# Patient Record
Sex: MALE | Race: WHITE | NOT HISPANIC OR LATINO | URBAN - METROPOLITAN AREA
[De-identification: names, ages, dates, MRNs, and addresses within clinical notes are randomized per-mention and may not be internally consistent; named-entity substitution may affect disease eponyms.]

---

## 2017-01-09 ENCOUNTER — INPATIENT (INPATIENT)
Facility: HOSPITAL | Age: 67
LOS: 1 days | Discharge: ANOTHER HOSPITAL | End: 2017-01-11
Attending: INTERNAL MEDICINE | Admitting: FAMILY MEDICINE
Payer: COMMERCIAL

## 2017-01-09 DIAGNOSIS — R79.89 OTHER SPECIFIED ABNORMAL FINDINGS OF BLOOD CHEMISTRY: ICD-10-CM

## 2017-01-09 DIAGNOSIS — I47.1 SUPRAVENTRICULAR TACHYCARDIA: ICD-10-CM

## 2017-01-09 PROCEDURE — 70450 CT HEAD/BRAIN W/O DYE: CPT | Mod: 26

## 2017-01-09 PROCEDURE — 93306 TTE W/DOPPLER COMPLETE: CPT | Mod: 26

## 2017-01-09 PROCEDURE — 71010: CPT | Mod: 26

## 2017-01-09 PROCEDURE — 99285 EMERGENCY DEPT VISIT HI MDM: CPT

## 2017-01-09 PROCEDURE — 93010 ELECTROCARDIOGRAM REPORT: CPT

## 2017-01-10 PROCEDURE — 93010 ELECTROCARDIOGRAM REPORT: CPT

## 2017-01-11 ENCOUNTER — INPATIENT (INPATIENT)
Facility: HOSPITAL | Age: 67
LOS: 1 days | Discharge: HOME | DRG: 274 | End: 2017-01-13
Attending: INTERNAL MEDICINE | Admitting: FAMILY MEDICINE
Payer: COMMERCIAL

## 2017-01-11 VITALS
SYSTOLIC BLOOD PRESSURE: 155 MMHG | OXYGEN SATURATION: 96 % | TEMPERATURE: 98 F | HEIGHT: 70 IN | DIASTOLIC BLOOD PRESSURE: 88 MMHG | WEIGHT: 191.8 LBS | RESPIRATION RATE: 18 BRPM | HEART RATE: 86 BPM

## 2017-01-11 DIAGNOSIS — I47.1 SUPRAVENTRICULAR TACHYCARDIA: ICD-10-CM

## 2017-01-11 PROCEDURE — 99285 EMERGENCY DEPT VISIT HI MDM: CPT

## 2017-01-11 PROCEDURE — 99222 1ST HOSP IP/OBS MODERATE 55: CPT

## 2017-01-11 PROCEDURE — 93010 ELECTROCARDIOGRAM REPORT: CPT

## 2017-01-11 RX ORDER — METOPROLOL TARTRATE 50 MG
25 TABLET ORAL DAILY
Qty: 0 | Refills: 0 | Status: DISCONTINUED | OUTPATIENT
Start: 2017-01-11 | End: 2017-01-12

## 2017-01-11 RX ORDER — ATORVASTATIN CALCIUM 80 MG/1
20 TABLET, FILM COATED ORAL AT BEDTIME
Qty: 0 | Refills: 0 | Status: DISCONTINUED | OUTPATIENT
Start: 2017-01-11 | End: 2017-01-13

## 2017-01-11 RX ORDER — INFLUENZA VIRUS VACCINE 15; 15; 15; 15 UG/.5ML; UG/.5ML; UG/.5ML; UG/.5ML
0.5 SUSPENSION INTRAMUSCULAR ONCE
Qty: 0 | Refills: 0 | Status: COMPLETED | OUTPATIENT
Start: 2017-01-11 | End: 2017-01-13

## 2017-01-11 RX ORDER — CHOLECALCIFEROL (VITAMIN D3) 125 MCG
1000 CAPSULE ORAL DAILY
Qty: 0 | Refills: 0 | Status: DISCONTINUED | OUTPATIENT
Start: 2017-01-11 | End: 2017-01-13

## 2017-01-11 RX ORDER — ASPIRIN/CALCIUM CARB/MAGNESIUM 324 MG
81 TABLET ORAL DAILY
Qty: 0 | Refills: 0 | Status: DISCONTINUED | OUTPATIENT
Start: 2017-01-11 | End: 2017-01-13

## 2017-01-11 RX ADMIN — ATORVASTATIN CALCIUM 20 MILLIGRAM(S): 80 TABLET, FILM COATED ORAL at 23:34

## 2017-01-11 NOTE — H&P ADULT. - NEGATIVE NEUROLOGICAL SYMPTOMS
no hemiparesis/no transient paralysis/no generalized seizures/no loss of sensation/no paresthesias/no facial palsy/no headache/no confusion/no vertigo/no focal seizures/no syncope/no difficulty walking/no weakness/no loss of consciousness/no tremors

## 2017-01-11 NOTE — ED ADULT NURSE NOTE - CHIEF COMPLAINT QUOTE
Patient BIBA, sent from [facility] for an ablation, originally went to [facility] for SVT and was given adenosine, patient offers no complaints at this time, denies any pain

## 2017-01-11 NOTE — H&P ADULT. - HISTORY OF PRESENT ILLNESS
67 years old male with PMH of HTN and HLD presented to [facility] 2 days ago with tingling in arms and not feeling well. Found to have SVT - got Adenosine and broke to Normal Sinus Rhythm. He was seen by Cardiologist and was recommended Ablation.  Currently, patient denies any chest pain, palpitations, shortness of breath, headache or dizziness.   He had ECHO which showed EF of 60%. Had Stress test in June 2016 and it was normal. 67 years old male with PMH of HTN and HLD presented to [facility] 2 days ago with tingling in arms and not feeling well. Found to have SVT - got Adenosine and cardioverted to Normal Sinus Rhythm. He was seen by Cardiologist and was recommended Ablation.  Currently, patient denies any chest pain, palpitations, shortness of breath, headache or dizziness.   He had ECHO which showed EF of 60%. Had Stress test in June 2016 and it was normal.

## 2017-01-11 NOTE — ED ADULT NURSE NOTE - OBJECTIVE STATEMENT
pt alert and awake x3 arrived from [facility] under cardiology for ablation, denies pain at this time, LS clear, resp even and unlabored bilat, arrived with 16g right AC and 20g left AC from [facility], awaiting for admission bed, will continue to monitor.

## 2017-01-11 NOTE — H&P ADULT. - ASSESSMENT
67 years old male with PMH of HTN and HLD presented to [facility] 2 days ago with tingling in arms and not feeling well. Found to have SVT - got Adenosine and broke to Normal Sinus Rhythm. He was seen by Cardiologist and was recommended Ablation.    1) SVT  - s/p Adenosine - currently in sinus rhythm. Needs ablation.  - NPO after midnight    2) HTN  - Continue Metoprolol 25 mg    3) HLD  - Continue Lipitor 20 mg    DVT Porphylaxis -- Heparin 5000 Units

## 2017-01-11 NOTE — ED PROVIDER NOTE - NS ED MD SCRIBE ATTENDING SCRIBE SECTIONS
PHYSICAL EXAM/REVIEW OF SYSTEMS/PAST MEDICAL/SURGICAL/SOCIAL HISTORY/HISTORY OF PRESENT ILLNESS/DISPOSITION/VITAL SIGNS( Pullset)

## 2017-01-11 NOTE — ED PROVIDER NOTE - OBJECTIVE STATEMENT
A 67 year old male pt presents to the ED as a transfer from [facility]. The pt was experiencing SVT and was sent to the ED 2 days ago. He was given adenosine and broke out of SVT. The pt was to be admitted today to be seen by cardiology for an ablation. He currently denies any symptoms and states that he feels fine. No further complaints at this time.

## 2017-01-11 NOTE — ED PROVIDER NOTE - MEDICAL DECISION MAKING DETAILS
A 67 year old male pt presents as a transfer from [facility]. Pt to be admitted to hospitalist service.

## 2017-01-11 NOTE — ED PROVIDER NOTE - PROGRESS NOTE DETAILS
Called logistics and spoke with Chacho who states pt is to be admitted to hospitalist service. Spoke with hospitalist who accepts pt for admission.

## 2017-01-11 NOTE — ED ADULT TRIAGE NOTE - CHIEF COMPLAINT QUOTE
Patient BIBA, sent from [facility] for an ablation, originally went to [facility] for SVT and was given adenosine, patient offers no complaints at this time, denies any pain

## 2017-01-11 NOTE — H&P ADULT. - NEGATIVE CARDIOVASCULAR SYMPTOMS
no chest pain/no paroxysmal nocturnal dyspnea/no dyspnea on exertion/no palpitations/no peripheral edema/no orthopnea

## 2017-01-11 NOTE — H&P ADULT. - NEUROLOGICAL DETAILS
responds to verbal commands/alert and oriented x 3/sensation intact/cranial nerves intact/normal strength

## 2017-01-11 NOTE — H&P ADULT. - FAMILY HISTORY
Mother  Still living? Unknown  Family history of leukemia, Age at diagnosis: Age Unknown     Sibling  Still living? Unknown  Family history of cancer, Age at diagnosis: Age Unknown     Aunt  Still living? Unknown  Family history of cancer, Age at diagnosis: Age Unknown

## 2017-01-12 DIAGNOSIS — Z90.89 ACQUIRED ABSENCE OF OTHER ORGANS: Chronic | ICD-10-CM

## 2017-01-12 DIAGNOSIS — Z98.890 OTHER SPECIFIED POSTPROCEDURAL STATES: Chronic | ICD-10-CM

## 2017-01-12 LAB
ANION GAP SERPL CALC-SCNC: 14 MMOL/L — SIGNIFICANT CHANGE UP (ref 5–17)
APTT BLD: 37.8 SEC — HIGH (ref 27.5–37.4)
BASOPHILS # BLD AUTO: 0 K/UL — SIGNIFICANT CHANGE UP (ref 0–0.2)
BASOPHILS NFR BLD AUTO: 0.2 % — SIGNIFICANT CHANGE UP (ref 0–2)
BLD GP AB SCN SERPL QL: SIGNIFICANT CHANGE UP
BUN SERPL-MCNC: 22 MG/DL — HIGH (ref 8–20)
CALCIUM SERPL-MCNC: 8.9 MG/DL — SIGNIFICANT CHANGE UP (ref 8.6–10.2)
CHLORIDE SERPL-SCNC: 100 MMOL/L — SIGNIFICANT CHANGE UP (ref 98–107)
CO2 SERPL-SCNC: 24 MMOL/L — SIGNIFICANT CHANGE UP (ref 22–29)
CREAT SERPL-MCNC: 0.84 MG/DL — SIGNIFICANT CHANGE UP (ref 0.5–1.3)
EOSINOPHIL # BLD AUTO: 0.2 K/UL — SIGNIFICANT CHANGE UP (ref 0–0.5)
EOSINOPHIL NFR BLD AUTO: 2.1 % — SIGNIFICANT CHANGE UP (ref 0–5)
GLUCOSE SERPL-MCNC: 102 MG/DL — SIGNIFICANT CHANGE UP (ref 70–115)
HCT VFR BLD CALC: 38.9 % — LOW (ref 42–52)
HGB BLD-MCNC: 13.3 G/DL — LOW (ref 14–18)
INR BLD: 1.12 RATIO — SIGNIFICANT CHANGE UP (ref 0.88–1.16)
LYMPHOCYTES # BLD AUTO: 3.1 K/UL — SIGNIFICANT CHANGE UP (ref 1–4.8)
LYMPHOCYTES # BLD AUTO: 31.4 % — SIGNIFICANT CHANGE UP (ref 20–55)
MAGNESIUM SERPL-MCNC: 1.9 MG/DL — SIGNIFICANT CHANGE UP (ref 1.8–2.5)
MCHC RBC-ENTMCNC: 29.6 PG — SIGNIFICANT CHANGE UP (ref 27–31)
MCHC RBC-ENTMCNC: 34.2 G/DL — SIGNIFICANT CHANGE UP (ref 32–36)
MCV RBC AUTO: 86.6 FL — SIGNIFICANT CHANGE UP (ref 80–94)
MONOCYTES # BLD AUTO: 0.7 K/UL — SIGNIFICANT CHANGE UP (ref 0–0.8)
MONOCYTES NFR BLD AUTO: 6.9 % — SIGNIFICANT CHANGE UP (ref 3–10)
NEUTROPHILS # BLD AUTO: 5.8 K/UL — SIGNIFICANT CHANGE UP (ref 1.8–8)
NEUTROPHILS NFR BLD AUTO: 59.2 % — SIGNIFICANT CHANGE UP (ref 37–73)
PHOSPHATE SERPL-MCNC: 3.3 MG/DL — SIGNIFICANT CHANGE UP (ref 2.4–4.7)
PLATELET # BLD AUTO: 317 K/UL — SIGNIFICANT CHANGE UP (ref 150–400)
POTASSIUM SERPL-MCNC: 4.1 MMOL/L — SIGNIFICANT CHANGE UP (ref 3.5–5.3)
POTASSIUM SERPL-SCNC: 4.1 MMOL/L — SIGNIFICANT CHANGE UP (ref 3.5–5.3)
PROTHROM AB SERPL-ACNC: 12.3 SEC — SIGNIFICANT CHANGE UP (ref 10–13.1)
RBC # BLD: 4.49 M/UL — LOW (ref 4.6–6.2)
RBC # FLD: 13.7 % — SIGNIFICANT CHANGE UP (ref 11–15.6)
SODIUM SERPL-SCNC: 138 MMOL/L — SIGNIFICANT CHANGE UP (ref 135–145)
TYPE + AB SCN PNL BLD: SIGNIFICANT CHANGE UP
WBC # BLD: 9.75 K/UL — SIGNIFICANT CHANGE UP (ref 4.8–10.8)
WBC # FLD AUTO: 9.75 K/UL — SIGNIFICANT CHANGE UP (ref 4.8–10.8)

## 2017-01-12 PROCEDURE — 99232 SBSQ HOSP IP/OBS MODERATE 35: CPT

## 2017-01-12 PROCEDURE — 93010 ELECTROCARDIOGRAM REPORT: CPT

## 2017-01-12 RX ORDER — ATORVASTATIN CALCIUM 80 MG/1
1 TABLET, FILM COATED ORAL
Qty: 0 | Refills: 0 | COMMUNITY

## 2017-01-12 RX ORDER — ALPRAZOLAM 0.25 MG
0.25 TABLET ORAL EVERY 6 HOURS
Qty: 0 | Refills: 0 | Status: DISCONTINUED | OUTPATIENT
Start: 2017-01-12 | End: 2017-01-13

## 2017-01-12 RX ORDER — ONDANSETRON 8 MG/1
4 TABLET, FILM COATED ORAL EVERY 6 HOURS
Qty: 0 | Refills: 0 | Status: DISCONTINUED | OUTPATIENT
Start: 2017-01-12 | End: 2017-01-13

## 2017-01-12 RX ORDER — CHOLECALCIFEROL (VITAMIN D3) 125 MCG
1 CAPSULE ORAL
Qty: 0 | Refills: 0 | COMMUNITY

## 2017-01-12 RX ORDER — ACETAMINOPHEN 500 MG
650 TABLET ORAL EVERY 6 HOURS
Qty: 0 | Refills: 0 | Status: DISCONTINUED | OUTPATIENT
Start: 2017-01-12 | End: 2017-01-13

## 2017-01-12 RX ORDER — HEPARIN SODIUM 5000 [USP'U]/ML
5000 INJECTION INTRAVENOUS; SUBCUTANEOUS EVERY 12 HOURS
Qty: 0 | Refills: 0 | Status: DISCONTINUED | OUTPATIENT
Start: 2017-01-12 | End: 2017-01-12

## 2017-01-12 RX ORDER — SODIUM CHLORIDE 9 MG/ML
1000 INJECTION, SOLUTION INTRAVENOUS
Qty: 0 | Refills: 0 | Status: DISCONTINUED | OUTPATIENT
Start: 2017-01-12 | End: 2017-01-13

## 2017-01-12 RX ORDER — ASPIRIN/CALCIUM CARB/MAGNESIUM 324 MG
1 TABLET ORAL
Qty: 0 | Refills: 0 | COMMUNITY

## 2017-01-12 RX ORDER — HEPARIN SODIUM 5000 [USP'U]/ML
5000 INJECTION INTRAVENOUS; SUBCUTANEOUS EVERY 8 HOURS
Qty: 0 | Refills: 0 | Status: DISCONTINUED | OUTPATIENT
Start: 2017-01-13 | End: 2017-01-13

## 2017-01-12 RX ADMIN — HEPARIN SODIUM 5000 UNIT(S): 5000 INJECTION INTRAVENOUS; SUBCUTANEOUS at 05:18

## 2017-01-12 RX ADMIN — Medication 81 MILLIGRAM(S): at 19:00

## 2017-01-12 RX ADMIN — Medication 650 MILLIGRAM(S): at 21:20

## 2017-01-12 RX ADMIN — HEPARIN SODIUM 5000 UNIT(S): 5000 INJECTION INTRAVENOUS; SUBCUTANEOUS at 00:15

## 2017-01-12 RX ADMIN — ATORVASTATIN CALCIUM 20 MILLIGRAM(S): 80 TABLET, FILM COATED ORAL at 21:20

## 2017-01-12 RX ADMIN — SODIUM CHLORIDE 100 MILLILITER(S): 9 INJECTION, SOLUTION INTRAVENOUS at 08:34

## 2017-01-12 RX ADMIN — Medication 1000 UNIT(S): at 19:00

## 2017-01-12 RX ADMIN — Medication 0.25 MILLIGRAM(S): at 21:20

## 2017-01-12 RX ADMIN — Medication 25 MILLIGRAM(S): at 05:22

## 2017-01-12 NOTE — DISCHARGE NOTE ADULT - NS AS ACTIVITY OBS
Walking-Indoors allowed/Showering allowed/Do not make important decisions/No Heavy lifting/straining/Do not drive or operate machinery/Walking-Outdoors allowed

## 2017-01-12 NOTE — DISCHARGE NOTE ADULT - HOSPITAL COURSE
67 years old male with PMH of HTN and HLD [facility] with tingling in arms and not feeling well. Found to have SVT - got Adenosine and broke to Normal Sinus Rhythm. He was seen by Cardiologist and was recommended Ablation. Now s/p ablation, tolerated well.    1) SVT  - s/p Adenosine - currently in sinus rhythm. S/P ablation, off BB as per cardio , follow up with cardio as tolerated    2) HTN - well controlled - off BB by cardio, follow up with cardio as outpatient    3) HLD  - Continue Lipitor 20 mg 67 years old male with PMH of HTN and HLD presented to [facility] 2 days ago with tingling in arms and not feeling well. Found to have SVT - got Adenosine and broke to Normal Sinus Rhythm. He was seen by Cardiologist and was recommended Ablation. Transferred to [facility] for EPS/RFA. 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 Doing well, stable for discharge    1) SVT  - s/p Adenosine - currently in sinus rhythm. S/P ablation, off BB as per cardio , follow up with cardio as tolerated    2) HTN - well controlled - off BB by cardio, follow up with cardio as outpatient    3) HLD  - Continue Lipitor 20 mg    4)add protonix 40 mg po daily x 1 month per Dr Marie  5) f/u Dr Marie 2-4 weeks 67 years old male with PMH of HTN and HLD presented to [facility] 2 days ago with tingling in arms and not feeling well. Found to have SVT - got Adenosine and broke to Normal Sinus Rhythm. He was seen by Cardiologist and was recommended Ablation. Transferred to [facility] for EPS/RFA. 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 Doing well, stable for discharge    1) SVT  - s/p Adenosine - currently in sinus rhythm. S/P ablation, off BB as per cardio , follow up with cardio as tolerated    2) HTN - well controlled - off BB by cardio, follow up with cardio as outpatient    3) HLD  - Continue Lipitor 20 mg    4)add protonix 40 mg po daily x 1 month per Dr Marie  5) f/u Dr Marie 2-4 weeks    seen on 1/13/17: no acute complaints in good spirits. VSS afebrile, AAOx3, NAD RRR s1s2, CTAB soft +BS, no edema. Right groin: no TTP, no ecchymosis noted.  nonfocal neurologic exam.   d/c home in stable condition. d/c planning 30 min.  d/w patient in detail.

## 2017-01-12 NOTE — PROGRESS NOTE ADULT - SUBJECTIVE AND OBJECTIVE BOX
PROCEDURE(S): Radiofrequency slow pathway modification for AVNRT    ELECTRPHYSIOLOGIST(S): Kole Marie MD         COMPLICATIONS:  none        DISPOSITION:  observation to telemetry     CONDITION: Stable      Pt doing well s/p uncomplicated AVNRT ablation. Denies complaint.       MEDICATIONS  (STANDING):  aspirin  chewable 81milliGRAM(s) Oral daily  atorvastatin 20milliGRAM(s) Oral at bedtime  cholecalciferol 1000Unit(s) Oral daily  influenza   Vaccine 0.5milliLiter(s) IntraMuscular once  heparin  Injectable 5000Unit(s) SubCutaneous every 12 hours  dextrose 5% + sodium chloride 0.45%. 1000milliLiter(s) IV Continuous <Continuous>    MEDICATIONS  (PRN):  acetaminophen   Tablet. 650milliGRAM(s) Oral every 6 hours PRN Mild Pain (1 - 3)  oxyCODONE  5 mG/acetaminophen 325 mG 1Tablet(s) Oral every 4 hours PRN Moderate Pain (4 - 6)  ondansetron Injectable 4milliGRAM(s) IV Push every 6 hours PRN Nausea and/or Vomiting  ALPRAZolam 0.25milliGRAM(s) Oral every 6 hours PRN anxiety and/or insomnia      Allergies  No Known Allergies      Exam:   HR: 83 (73 - 87)  BP: 99/55 (99/55 - 155/88)  RR: 18 (16 - 20)  SpO2: 96% (95% - 98%)  VSS, NAD, A&O x 3  Card: S1/S2, RRR, no m/g/r  Resp: lungs CTA b/l  Abd: S/NT/ND  Groins: C/D/I; no bleeding, hematoma, erythema, exudate or edema  Ext: no edema; distal pulses intact      Assessment:   67y male h/o adenosine sensitive SVT, now status post uncomplicated radiofrequency ablation of AVNRT.      Plan:   Bedrest x 4 hours following sheath removal and hemostasis, then OOB w/ assist & progress as tolerated.   Pain control w/ PO analgesia PRN.   Do not continue metoprolol.   Continue other prior  medications.   Start Protonix 40mg daily x 30 days.   Observation and monitoring on telemetry overnight with anticipated discharge in the AM and outpt follow up in 1 month.

## 2017-01-12 NOTE — DISCHARGE NOTE ADULT - MEDICATION SUMMARY - MEDICATIONS TO TAKE
I will START or STAY ON the medications listed below when I get home from the hospital:    aspirin 81 mg oral tablet  -- 1 tab(s) by mouth once a day  -- Indication: For antiplatelet    Lipitor 20 mg oral tablet  -- 1 tab(s) by mouth once a day  -- Indication: For Hld    pantoprazole 40 mg oral delayed release tablet  -- 1 tab(s) by mouth once a day (before a meal)  -- Indication: For gi prophy    Vitamin D3 1000 intl units oral tablet  -- 1 tab(s) by mouth once a day  -- Indication: For Supplement I will START or STAY ON the medications listed below when I get home from the hospital:    aspirin 81 mg oral tablet  -- 1 tab(s) by mouth once a day  -- Indication: For antiplatelet    Lipitor 20 mg oral tablet  -- 1 tab(s) by mouth once a day  -- Indication: For Hld    pantoprazole 40 mg oral delayed release tablet  -- 1 tab(s) by mouth once a day (before a meal)  -- Indication: For GI prophylaxis    Vitamin D3 1000 intl units oral tablet  -- 1 tab(s) by mouth once a day  -- Indication: For Supplement

## 2017-01-12 NOTE — PROGRESS NOTE ADULT - SUBJECTIVE AND OBJECTIVE BOX
Patient seen and examined . S/p  , POD #     CC :     HPI:  67 years old male with PMH of HTN and HLD presented to [facility] 2 days ago with tingling in arms and not feeling well. Found to have SVT - got Adenosine and cardioverted to Normal Sinus Rhythm. He was seen by Cardiologist and was recommended Ablation.  Currently, patient denies any chest pain, palpitations, shortness of breath, headache or dizziness.   He had ECHO which showed EF of 60%. Had Stress test in June 2016 and it was normal. Now s/p ablation.      PAST MEDICAL & SURGICAL HISTORY:  High cholesterol  HTN (hypertension)  History of tonsillectomy  H/O hernia repair  H/O knee surgery      MEDICATIONS  (STANDING):  aspirin  chewable 81milliGRAM(s) Oral daily  atorvastatin 20milliGRAM(s) Oral at bedtime  cholecalciferol 1000Unit(s) Oral daily  influenza   Vaccine 0.5milliLiter(s) IntraMuscular once  heparin  Injectable 5000Unit(s) SubCutaneous every 12 hours  dextrose 5% + sodium chloride 0.45%. 1000milliLiter(s) IV Continuous <Continuous>    MEDICATIONS  (PRN):  acetaminophen   Tablet. 650milliGRAM(s) Oral every 6 hours PRN Mild Pain (1 - 3)  oxyCODONE  5 mG/acetaminophen 325 mG 1Tablet(s) Oral every 4 hours PRN Moderate Pain (4 - 6)  ondansetron Injectable 4milliGRAM(s) IV Push every 6 hours PRN Nausea and/or Vomiting  ALPRAZolam 0.25milliGRAM(s) Oral every 6 hours PRN anxiety and/or insomnia      LABS:                          13.3   9.75  )-----------( 317      ( 12 Jan 2017 07:07 )             38.9     12 Jan 2017 07:07    138    |  100    |  22.0   ----------------------------<  102    4.1     |  24.0   |  0.84     Ca    8.9        12 Jan 2017 07:07  Phos  3.3       12 Jan 2017 07:07  Mg     1.9       12 Jan 2017 07:07      PT/INR - ( 12 Jan 2017 10:50 )   PT: 12.3 sec;   INR: 1.12 ratio         PTT - ( 12 Jan 2017 10:50 )  PTT:37.8 sec      RADIOLOGY & ADDITIONAL TESTS:        REVIEW OF SYSTEMS:    CONSTITUTIONAL: No fever, weight loss, or fatigue  EYES: No eye pain, visual disturbances, or discharge  ENMT:  No difficulty hearing, tinnitus, vertigo; No sinus or throat pain  NECK: No pain or stiffness  RESPIRATORY: No cough, wheezing, chills or hemoptysis; No shortness of breath  CARDIOVASCULAR: No chest pain, palpitations, dizziness, or leg swelling  GASTROINTESTINAL: No abdominal or epigastric pain. No nausea, vomiting, or hematemesis; No diarrhea or constipation. No melena or hematochezia.  GENITOURINARY: No dysuria, frequency, hematuria, or incontinence  NEUROLOGICAL: No headaches, memory loss, loss of strength, numbness, or tremors  SKIN: No itching, burning, rashes, or lesions   LYMPH NODES: No enlarged glands  ENDOCRINE: No heat or cold intolerance; No hair loss  MUSCULOSKELETAL:   PSYCHIATRIC: No depression, anxiety, mood swings, or difficulty sleeping  HEME/LYMPH: No easy bruising, or bleeding gums  ALLERGY AND IMMUNOLOGIC: No hives or eczema    Vital Signs Last 24 Hrs  T(C): 36.5, Max: 36.7 (01-11 @ 22:20)  T(F): 97.7, Max: 98.1 (01-11 @ 22:20)  HR: 83 (73 - 87)  BP: 99/55 (99/55 - 155/88)  BP(mean): --  RR: 18 (16 - 20)  SpO2: 96% (95% - 98%)  PHYSICAL EXAM:    GENERAL: NAD, well-groomed, well-developed  HEAD:  Atraumatic, Normocephalic  EYES: EOMI, PERRLA, conjunctiva and sclera clear  NECK: Supple, No JVD, Normal thyroid  NERVOUS SYSTEM:  Alert & Oriented X3, no focal deficit  CHEST/LUNG: CTA b/l ,  no  rales, rhonchi, wheezing, or rubs  HEART: Regular rate and rhythm; No murmurs, rubs, or gallops  ABDOMEN: Soft, Nontender, Nondistended; Bowel sounds present  EXTREMITIES:  2+ Peripheral Pulses, No clubbing, cyanosis, or edema  LYMPH: No lymphadenopathy noted  SKIN: No rashes or lesions Patient seen and examined . S/p ablation , tolerated well .    CC : not feeling well , b/l arms tingling- now resolved    HPI:  67 years old male with PMH of HTN and HLD presented to [facility] 2 days ago with tingling in arms and not feeling well. Found to have SVT - got Adenosine and cardioverted to Normal Sinus Rhythm. He was seen by Cardiologist and was recommended Ablation.  Currently, patient denies any chest pain, palpitations, shortness of breath, headache or dizziness.   He had ECHO which showed EF of 60%. Had Stress test in June 2016 and it was normal. Now s/p ablation.      PAST MEDICAL & SURGICAL HISTORY:  High cholesterol  HTN (hypertension)  History of tonsillectomy  H/O hernia repair  H/O knee surgery      MEDICATIONS  (STANDING):  aspirin  chewable 81milliGRAM(s) Oral daily  atorvastatin 20milliGRAM(s) Oral at bedtime  cholecalciferol 1000Unit(s) Oral daily  influenza   Vaccine 0.5milliLiter(s) IntraMuscular once  heparin  Injectable 5000Unit(s) SubCutaneous every 12 hours  dextrose 5% + sodium chloride 0.45%. 1000milliLiter(s) IV Continuous <Continuous>    MEDICATIONS  (PRN):  acetaminophen   Tablet. 650milliGRAM(s) Oral every 6 hours PRN Mild Pain (1 - 3)  oxyCODONE  5 mG/acetaminophen 325 mG 1Tablet(s) Oral every 4 hours PRN Moderate Pain (4 - 6)  ondansetron Injectable 4milliGRAM(s) IV Push every 6 hours PRN Nausea and/or Vomiting  ALPRAZolam 0.25milliGRAM(s) Oral every 6 hours PRN anxiety and/or insomnia      LABS:                          13.3   9.75  )-----------( 317      ( 12 Jan 2017 07:07 )             38.9     12 Jan 2017 07:07    138    |  100    |  22.0   ----------------------------<  102    4.1     |  24.0   |  0.84     Ca    8.9        12 Jan 2017 07:07  Phos  3.3       12 Jan 2017 07:07  Mg     1.9       12 Jan 2017 07:07      PT/INR - ( 12 Jan 2017 10:50 )   PT: 12.3 sec;   INR: 1.12 ratio         PTT - ( 12 Jan 2017 10:50 )  PTT:37.8 sec      RADIOLOGY & ADDITIONAL TESTS: none        REVIEW OF SYSTEMS:    CONSTITUTIONAL: No fever, weight loss, or fatigue  EYES: No eye pain, visual disturbances, or discharge  ENMT:  No difficulty hearing, tinnitus, vertigo; No sinus or throat pain  NECK: No pain or stiffness  RESPIRATORY: No cough, wheezing, chills or hemoptysis; No shortness of breath  CARDIOVASCULAR: No chest pain, palpitations, dizziness, or leg swelling  GASTROINTESTINAL: No abdominal or epigastric pain. No nausea, vomiting, or hematemesis; No diarrhea or constipation. No melena or hematochezia.  GENITOURINARY: No dysuria, frequency, hematuria, or incontinence  NEUROLOGICAL: No headaches, memory loss, loss of strength, numbness, or tremors  SKIN: No itching, burning, rashes, or lesions   LYMPH NODES: No enlarged glands  ENDOCRINE: No heat or cold intolerance; No hair loss  MUSCULOSKELETAL:   PSYCHIATRIC: No depression, anxiety, mood swings, or difficulty sleeping  HEME/LYMPH: No easy bruising, or bleeding gums  ALLERGY AND IMMUNOLOGIC: No hives or eczema    Vital Signs Last 24 Hrs  T(C): 36.5, Max: 36.7 (01-11 @ 22:20)  T(F): 97.7, Max: 98.1 (01-11 @ 22:20)  HR: 83 (73 - 87)  BP: 99/55 (99/55 - 155/88)  BP(mean): --  RR: 18 (16 - 20)  SpO2: 96% (95% - 98%)  PHYSICAL EXAM:    GENERAL: NAD, well-groomed, well-developed  HEAD:  Atraumatic, Normocephalic  EYES: EOMI, PERRLA, conjunctiva and sclera clear  NECK: Supple, No JVD, Normal thyroid  NERVOUS SYSTEM:  Alert & Oriented X3, no focal deficit  CHEST/LUNG: CTA b/l ,  no  rales, rhonchi, wheezing, or rubs  HEART: Regular rate and rhythm; No murmurs, rubs, or gallops  ABDOMEN: Soft, Nontender, Nondistended; Bowel sounds present  EXTREMITIES:  2+ Peripheral Pulses, No clubbing, cyanosis, or edema  LYMPH: No lymphadenopathy noted  SKIN: No rashes or lesions

## 2017-01-12 NOTE — DISCHARGE NOTE ADULT - PLAN OF CARE
minimize arrhythmia burden; optimize cardiac health - Bruising at the groin, sometimes extending down the leg, and/or a small lump under the skin at the groin access site is normal and will resolve within 2 – 3 weeks.   - Occasional skipped beats or palpitations that last for a few beats are common and generally resolve within 1-2 months.   - You make walk and take stairs at a regular pace.   - Do not perform any exercise more strenuous than walking for 1 week.   - Do not strain or lift heavy objects for 1 week.  - You may shower the day after the procedure.  - Do not soak in water (such as tub baths, hot tubs, swimming, etc.) for 1 week.   - You may resume all other activities the day after the procedure.  Call your doctor if:   - you notice bleeding, redness, drainage, swelling, increased tenderness or a hot sensation around the catheter insertion site.   - your temperature is greater than 100 degrees F for more than 24 hours.  - your rapid heart rhythm returns.  - you have any questions or concerns regarding the procedure.  If significant bleeding and/or a large lump (the size of a golf ball or bigger) occurs:  - Lie flat and apply continuous direct pressure just above the puncture site for at least 10 minutes  - If the issue resolves, notify your physician immediately.    - If the bleeding cannot be controlled, please seek immediate medical attention.  If you experience increased difficulty breathing or chest pain, or if you faint or have dizzy spells, please seek immediate medical attention.

## 2017-01-12 NOTE — DISCHARGE NOTE ADULT - VISION (WITH CORRECTIVE LENSES IF THE PATIENT USUALLY WEARS THEM):
wears bifocals Normal vision: sees adequately in most situations; can see medication labels, newsprint/wears bifocals

## 2017-01-12 NOTE — PROGRESS NOTE ADULT - ASSESSMENT
67 years old male with PMH of HTN and HLD presented to [facility] 2 days ago with tingling in arms and not feeling well. Found to have SVT - got Adenosine and broke to Normal Sinus Rhythm. He was seen by Cardiologist and was recommended Ablation.    1) SVT  - s/p Adenosine - currently in sinus rhythm. S/P ablation, cardiology follow       2) HTN  - Continue Metoprolol 25 mg    3) HLD  - Continue Lipitor 20 mg    DVT Porphylaxis -- Heparin 5000 Units 67 years old male with PMH of HTN and HLD presented to [facility] 2 days ago with tingling in arms and not feeling well. Found to have SVT - got Adenosine and broke to Normal Sinus Rhythm. He was seen by Cardiologist and was recommended Ablation. Now s/p ablation, tolerated well.    1) SVT  - s/p Adenosine - currently in sinus rhythm. S/P ablation, off BB as per cardio , will monitor for 24 hrs, if stable possible d/c home in am .    2) HTN - well controlled - off BB by cardio, follow up with cardio as outpatient    3) HLD  - Continue Lipitor 20 mg    DVT Porphylaxis -- Heparin 5000 Units, will hold tonight

## 2017-01-12 NOTE — PROGRESS NOTE ADULT - SUBJECTIVE AND OBJECTIVE BOX
preop for EPS/SVT ablation with Dr Marie  transferred from [facility] yesterday  chart reviewed  pt is npo  hold SQ heparin  hold metoprolol  stat PT/INR and type and screen    TTE 1/10/17: EF 60%, trace MR  reported negative stress test 6/2016    R/B/A reviewed, pt is agreeable

## 2017-01-12 NOTE — DISCHARGE NOTE ADULT - PATIENT PORTAL LINK FT
“You can access the [facility] Patient Portal, offered by [facility], by registering with the following website: [email or web address]”

## 2017-01-12 NOTE — DISCHARGE NOTE ADULT - CARE PROVIDER_API CALL
Kole Marie), Cardiovascular Disease; Internal Medicine  [address] [place] [address]  Phone: [phone]  Fax: [phone]

## 2017-01-13 VITALS
HEART RATE: 80 BPM | RESPIRATION RATE: 16 BRPM | SYSTOLIC BLOOD PRESSURE: 108 MMHG | TEMPERATURE: 98 F | OXYGEN SATURATION: 98 % | DIASTOLIC BLOOD PRESSURE: 60 MMHG

## 2017-01-13 LAB
ANION GAP SERPL CALC-SCNC: 11 MMOL/L — SIGNIFICANT CHANGE UP (ref 5–17)
BUN SERPL-MCNC: 20 MG/DL — SIGNIFICANT CHANGE UP (ref 8–20)
CALCIUM SERPL-MCNC: 9 MG/DL — SIGNIFICANT CHANGE UP (ref 8.6–10.2)
CHLORIDE SERPL-SCNC: 99 MMOL/L — SIGNIFICANT CHANGE UP (ref 98–107)
CO2 SERPL-SCNC: 26 MMOL/L — SIGNIFICANT CHANGE UP (ref 22–29)
CREAT SERPL-MCNC: 0.87 MG/DL — SIGNIFICANT CHANGE UP (ref 0.5–1.3)
GLUCOSE SERPL-MCNC: 89 MG/DL — SIGNIFICANT CHANGE UP (ref 70–115)
HCT VFR BLD CALC: 39 % — LOW (ref 42–52)
HGB BLD-MCNC: 13.3 G/DL — LOW (ref 14–18)
MAGNESIUM SERPL-MCNC: 2.1 MG/DL — SIGNIFICANT CHANGE UP (ref 1.8–2.5)
MCHC RBC-ENTMCNC: 29.3 PG — SIGNIFICANT CHANGE UP (ref 27–31)
MCHC RBC-ENTMCNC: 34.1 G/DL — SIGNIFICANT CHANGE UP (ref 32–36)
MCV RBC AUTO: 85.9 FL — SIGNIFICANT CHANGE UP (ref 80–94)
PLATELET # BLD AUTO: 317 K/UL — SIGNIFICANT CHANGE UP (ref 150–400)
POTASSIUM SERPL-MCNC: 3.9 MMOL/L — SIGNIFICANT CHANGE UP (ref 3.5–5.3)
POTASSIUM SERPL-SCNC: 3.9 MMOL/L — SIGNIFICANT CHANGE UP (ref 3.5–5.3)
RBC # BLD: 4.54 M/UL — LOW (ref 4.6–6.2)
RBC # FLD: 13.8 % — SIGNIFICANT CHANGE UP (ref 11–15.6)
SODIUM SERPL-SCNC: 136 MMOL/L — SIGNIFICANT CHANGE UP (ref 135–145)
WBC # BLD: 7.44 K/UL — SIGNIFICANT CHANGE UP (ref 4.8–10.8)
WBC # FLD AUTO: 7.44 K/UL — SIGNIFICANT CHANGE UP (ref 4.8–10.8)

## 2017-01-13 PROCEDURE — 84100 ASSAY OF PHOSPHORUS: CPT

## 2017-01-13 PROCEDURE — 85027 COMPLETE CBC AUTOMATED: CPT

## 2017-01-13 PROCEDURE — C1730: CPT

## 2017-01-13 PROCEDURE — 83735 ASSAY OF MAGNESIUM: CPT

## 2017-01-13 PROCEDURE — 90686 IIV4 VACC NO PRSV 0.5 ML IM: CPT

## 2017-01-13 PROCEDURE — 86901 BLOOD TYPING SEROLOGIC RH(D): CPT

## 2017-01-13 PROCEDURE — 85730 THROMBOPLASTIN TIME PARTIAL: CPT

## 2017-01-13 PROCEDURE — 99239 HOSP IP/OBS DSCHRG MGMT >30: CPT

## 2017-01-13 PROCEDURE — 93010 ELECTROCARDIOGRAM REPORT: CPT

## 2017-01-13 PROCEDURE — C1733: CPT

## 2017-01-13 PROCEDURE — 86900 BLOOD TYPING SEROLOGIC ABO: CPT

## 2017-01-13 PROCEDURE — 99285 EMERGENCY DEPT VISIT HI MDM: CPT

## 2017-01-13 PROCEDURE — 80048 BASIC METABOLIC PNL TOTAL CA: CPT

## 2017-01-13 PROCEDURE — 36415 COLL VENOUS BLD VENIPUNCTURE: CPT

## 2017-01-13 PROCEDURE — 93005 ELECTROCARDIOGRAM TRACING: CPT

## 2017-01-13 PROCEDURE — 86850 RBC ANTIBODY SCREEN: CPT

## 2017-01-13 PROCEDURE — 85610 PROTHROMBIN TIME: CPT

## 2017-01-13 RX ORDER — PANTOPRAZOLE SODIUM 20 MG/1
40 TABLET, DELAYED RELEASE ORAL
Qty: 0 | Refills: 0 | Status: DISCONTINUED | OUTPATIENT
Start: 2017-01-13 | End: 2017-01-13

## 2017-01-13 RX ORDER — METOPROLOL TARTRATE 50 MG
1 TABLET ORAL
Qty: 0 | Refills: 0 | COMMUNITY

## 2017-01-13 RX ORDER — PANTOPRAZOLE SODIUM 20 MG/1
1 TABLET, DELAYED RELEASE ORAL
Qty: 30 | Refills: 0 | OUTPATIENT
Start: 2017-01-13 | End: 2017-02-12

## 2017-01-13 RX ADMIN — HEPARIN SODIUM 5000 UNIT(S): 5000 INJECTION INTRAVENOUS; SUBCUTANEOUS at 05:23

## 2017-01-13 RX ADMIN — Medication 81 MILLIGRAM(S): at 11:32

## 2017-01-13 RX ADMIN — Medication 1000 UNIT(S): at 11:32

## 2017-01-13 RX ADMIN — Medication 650 MILLIGRAM(S): at 00:01

## 2017-01-13 RX ADMIN — INFLUENZA VIRUS VACCINE 0.5 MILLILITER(S): 15; 15; 15; 15 SUSPENSION INTRAMUSCULAR at 12:50

## 2017-01-13 NOTE — PROGRESS NOTE ADULT - SUBJECTIVE AND OBJECTIVE BOX
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 1000milliLiter(s) IV Continuous <Continuous>  heparin  Injectable 5000Unit(s) SubCutaneous every 8 hours    MEDICATIONS  (PRN):  acetaminophen   Tablet. 650milliGRAM(s) Oral every 6 hours PRN Mild Pain (1 - 3)  oxyCODONE  5 mG/acetaminophen 325 mG 1Tablet(s) Oral every 4 hours PRN Moderate Pain (4 - 6)  ondansetron Injectable 4milliGRAM(s) IV Push every 6 hours PRN Nausea and/or Vomiting  ALPRAZolam 0.25milliGRAM(s) Oral every 6 hours PRN anxiety and/or insomnia      Allergies    No Known Allergies    Intolerances      PAST MEDICAL & SURGICAL HISTORY:  High cholesterol  HTN (hypertension)  History of tonsillectomy  H/O hernia repair  H/O knee surgery      Vital Signs Last 24 Hrs  T(C): 36.4, Max: 36.7 (01-12 @ 21:15)  T(F): 97.5, Max: 98 (01-12 @ 21:15)  HR: 64 (64 - 83)  BP: 118/60 (96/63 - 138/83)  RR: 18 (18 - 18)  SpO2: 97% (94% - 97%)    Physical Exam:  Constitutional: NAD, AAOx3  Cardiovascular: +S1S2 RRR  Pulmonary: CTA b/l, unlabored  GI: soft NTND +BS  groins: bl groins, soft nt, no bleeding, swelling, hematoma, +distal pulses bl  Extremities: no pedal edema, +distal pulses b/l  Neuro: non focal, CHAVEZ x4    LABS:                        13.3   7.44  )-----------( 317      ( 13 Jan 2017 06:12 )             39.0     13 Jan 2017 06:12    136    |  99     |  20.0   ----------------------------<  89     3.9     |  26.0   |  0.87     Ca    9.0        13 Jan 2017 06:12  Phos  3.3       12 Jan 2017 07:07  Mg     2.1       13 Jan 2017 06:12      PT/INR - ( 12 Jan 2017 10:50 )   PT: 12.3 sec;   INR: 1.12 ratio         PTT - ( 12 Jan 2017 10:50 )  PTT:37.8 sec      A/P 68 yo male with pmhx HTN, HLD, transferred from [facility] for narrow complex, adenosine sensitive SVT. 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 Doing well, stable for discharge from EPS.    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 1000milliLiter(s) IV Continuous <Continuous>  heparin  Injectable 5000Unit(s) SubCutaneous every 8 hours    MEDICATIONS  (PRN):  acetaminophen   Tablet. 650milliGRAM(s) Oral every 6 hours PRN Mild Pain (1 - 3)  oxyCODONE  5 mG/acetaminophen 325 mG 1Tablet(s) Oral every 4 hours PRN Moderate Pain (4 - 6)  ondansetron Injectable 4milliGRAM(s) IV Push every 6 hours PRN Nausea and/or Vomiting  ALPRAZolam 0.25milliGRAM(s) Oral every 6 hours PRN anxiety and/or insomnia      Allergies    No Known Allergies    Intolerances      PAST MEDICAL & SURGICAL HISTORY:  High cholesterol  HTN (hypertension)  History of tonsillectomy  H/O hernia repair  H/O knee surgery      Vital Signs Last 24 Hrs  T(C): 36.4, Max: 36.7 (01-12 @ 21:15)  T(F): 97.5, Max: 98 (01-12 @ 21:15)  HR: 64 (64 - 83)  BP: 118/60 (96/63 - 138/83)  RR: 18 (18 - 18)  SpO2: 97% (94% - 97%)    Physical Exam:  Constitutional: NAD, AAOx3  Cardiovascular: +S1S2 RRR  Pulmonary: CTA b/l, unlabored  GI: soft NTND +BS  groins: bl groins, soft nt, no bleeding, swelling, hematoma, +distal pulses bl  Extremities: no pedal edema, +distal pulses b/l  Neuro: non focal, CHAVEZ x4    LABS:                        13.3   7.44  )-----------( 317      ( 13 Jan 2017 06:12 )             39.0     13 Jan 2017 06:12    136    |  99     |  20.0   ----------------------------<  89     3.9     |  26.0   |  0.87     Ca    9.0        13 Jan 2017 06:12  Phos  3.3       12 Jan 2017 07:07  Mg     2.1       13 Jan 2017 06:12      PT/INR - ( 12 Jan 2017 10:50 )   PT: 12.3 sec;   INR: 1.12 ratio         PTT - ( 12 Jan 2017 10:50 )  PTT:37.8 sec      A/P 68 yo male with pmhx HTN, HLD, transferred from [facility] for narrow complex, adenosine sensitive SVT. 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 Doing well, stable for discharge from EPS pending EKG this am.    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 1000milliLiter(s) IV Continuous <Continuous>  heparin  Injectable 5000Unit(s) SubCutaneous every 8 hours    MEDICATIONS  (PRN):  acetaminophen   Tablet. 650milliGRAM(s) Oral every 6 hours PRN Mild Pain (1 - 3)  oxyCODONE  5 mG/acetaminophen 325 mG 1Tablet(s) Oral every 4 hours PRN Moderate Pain (4 - 6)  ondansetron Injectable 4milliGRAM(s) IV Push every 6 hours PRN Nausea and/or Vomiting  ALPRAZolam 0.25milliGRAM(s) Oral every 6 hours PRN anxiety and/or insomnia      Allergies    No Known Allergies    Intolerances      PAST MEDICAL & SURGICAL HISTORY:  High cholesterol  HTN (hypertension)  History of tonsillectomy  H/O hernia repair  H/O knee surgery      Vital Signs Last 24 Hrs  T(C): 36.4, Max: 36.7 (01-12 @ 21:15)  T(F): 97.5, Max: 98 (01-12 @ 21:15)  HR: 64 (64 - 83)  BP: 118/60 (96/63 - 138/83)  RR: 18 (18 - 18)  SpO2: 97% (94% - 97%)    Physical Exam:  Constitutional: NAD, AAOx3  Cardiovascular: +S1S2 RRR  Pulmonary: CTA b/l, unlabored  GI: soft NTND +BS  groins: bl groins, soft nt, no bleeding, swelling, hematoma, +distal pulses bl  Extremities: no pedal edema, +distal pulses b/l  Neuro: non focal, CHAVEZ x4    LABS:                        13.3   7.44  )-----------( 317      ( 13 Jan 2017 06:12 )             39.0     13 Jan 2017 06:12    136    |  99     |  20.0   ----------------------------<  89     3.9     |  26.0   |  0.87     Ca    9.0        13 Jan 2017 06:12  Phos  3.3       12 Jan 2017 07:07  Mg     2.1       13 Jan 2017 06:12      PT/INR - ( 12 Jan 2017 10:50 )   PT: 12.3 sec;   INR: 1.12 ratio         PTT - ( 12 Jan 2017 10:50 )  PTT:37.8 sec      A/P 66 yo male with pmhx HTN, HLD, transferred from [facility] for narrow complex, adenosine sensitive SVT. 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 Doing well, stable for discharge from EPS.    DC lopressor, cont other outpt meds  add protonix 40 po daily x 30 days  medication changes, groin care and restrictions and outpt follow up reviewed with pt  f/u Dr Marie 2-4 weeks

## 2017-01-17 DIAGNOSIS — I25.10 ATHEROSCLEROTIC HEART DISEASE OF NATIVE CORONARY ARTERY WITHOUT ANGINA PECTORIS: ICD-10-CM

## 2017-01-17 DIAGNOSIS — I24.8 OTHER FORMS OF ACUTE ISCHEMIC HEART DISEASE: ICD-10-CM

## 2017-01-17 DIAGNOSIS — R31.29 OTHER MICROSCOPIC HEMATURIA: ICD-10-CM

## 2017-01-17 DIAGNOSIS — Z79.82 LONG TERM (CURRENT) USE OF ASPIRIN: ICD-10-CM

## 2017-01-17 DIAGNOSIS — I34.0 NONRHEUMATIC MITRAL (VALVE) INSUFFICIENCY: ICD-10-CM

## 2017-01-17 DIAGNOSIS — F17.290 NICOTINE DEPENDENCE, OTHER TOBACCO PRODUCT, UNCOMPLICATED: ICD-10-CM

## 2017-01-17 DIAGNOSIS — I47.1 SUPRAVENTRICULAR TACHYCARDIA: ICD-10-CM

## 2017-01-17 DIAGNOSIS — R00.2 PALPITATIONS: ICD-10-CM

## 2017-01-17 DIAGNOSIS — E78.5 HYPERLIPIDEMIA, UNSPECIFIED: ICD-10-CM

## 2017-01-17 DIAGNOSIS — I10 ESSENTIAL (PRIMARY) HYPERTENSION: ICD-10-CM

## 2017-11-13 ENCOUNTER — EMERGENCY (EMERGENCY)
Facility: HOSPITAL | Age: 67
LOS: 0 days | Discharge: HOME | End: 2017-11-13
Attending: EMERGENCY MEDICINE | Admitting: EMERGENCY MEDICINE
Payer: COMMERCIAL

## 2017-11-13 VITALS
SYSTOLIC BLOOD PRESSURE: 159 MMHG | OXYGEN SATURATION: 96 % | DIASTOLIC BLOOD PRESSURE: 94 MMHG | HEART RATE: 70 BPM | RESPIRATION RATE: 18 BRPM

## 2017-11-13 VITALS
TEMPERATURE: 98 F | RESPIRATION RATE: 22 BRPM | HEIGHT: 70 IN | WEIGHT: 190.04 LBS | SYSTOLIC BLOOD PRESSURE: 161 MMHG | DIASTOLIC BLOOD PRESSURE: 104 MMHG | HEART RATE: 53 BPM | OXYGEN SATURATION: 100 %

## 2017-11-13 DIAGNOSIS — Z98.890 OTHER SPECIFIED POSTPROCEDURAL STATES: Chronic | ICD-10-CM

## 2017-11-13 DIAGNOSIS — Z90.89 ACQUIRED ABSENCE OF OTHER ORGANS: Chronic | ICD-10-CM

## 2017-11-13 LAB
ALBUMIN SERPL ELPH-MCNC: 4.4 G/DL — SIGNIFICANT CHANGE UP (ref 3.3–5)
ALP SERPL-CCNC: 67 U/L — SIGNIFICANT CHANGE UP (ref 40–120)
ALT FLD-CCNC: 27 U/L — SIGNIFICANT CHANGE UP (ref 12–78)
ANION GAP SERPL CALC-SCNC: 6 MMOL/L — SIGNIFICANT CHANGE UP (ref 5–17)
AST SERPL-CCNC: 15 U/L — SIGNIFICANT CHANGE UP (ref 15–37)
BASOPHILS # BLD AUTO: 0.1 K/UL — SIGNIFICANT CHANGE UP (ref 0–0.2)
BASOPHILS NFR BLD AUTO: 0.7 % — SIGNIFICANT CHANGE UP (ref 0–2)
BILIRUB SERPL-MCNC: 0.4 MG/DL — SIGNIFICANT CHANGE UP (ref 0.2–1.2)
BUN SERPL-MCNC: 25 MG/DL — HIGH (ref 7–23)
CALCIUM SERPL-MCNC: 9.2 MG/DL — SIGNIFICANT CHANGE UP (ref 8.5–10.1)
CHLORIDE SERPL-SCNC: 105 MMOL/L — SIGNIFICANT CHANGE UP (ref 96–108)
CO2 SERPL-SCNC: 29 MMOL/L — SIGNIFICANT CHANGE UP (ref 22–31)
CREAT SERPL-MCNC: 1.19 MG/DL — SIGNIFICANT CHANGE UP (ref 0.5–1.3)
EOSINOPHIL # BLD AUTO: 0.1 K/UL — SIGNIFICANT CHANGE UP (ref 0–0.5)
EOSINOPHIL NFR BLD AUTO: 0.6 % — SIGNIFICANT CHANGE UP (ref 0–6)
GLUCOSE SERPL-MCNC: 115 MG/DL — HIGH (ref 70–99)
HCT VFR BLD CALC: 43.6 % — SIGNIFICANT CHANGE UP (ref 39–50)
HGB BLD-MCNC: 14.4 G/DL — SIGNIFICANT CHANGE UP (ref 13–17)
LIDOCAIN IGE QN: 84 U/L — SIGNIFICANT CHANGE UP (ref 73–393)
LYMPHOCYTES # BLD AUTO: 15 % — SIGNIFICANT CHANGE UP (ref 13–44)
LYMPHOCYTES # BLD AUTO: 2.2 K/UL — SIGNIFICANT CHANGE UP (ref 1–3.3)
MCHC RBC-ENTMCNC: 29.1 PG — SIGNIFICANT CHANGE UP (ref 27–34)
MCHC RBC-ENTMCNC: 33.1 GM/DL — SIGNIFICANT CHANGE UP (ref 32–36)
MCV RBC AUTO: 88.1 FL — SIGNIFICANT CHANGE UP (ref 80–100)
MONOCYTES # BLD AUTO: 0.4 K/UL — SIGNIFICANT CHANGE UP (ref 0–0.9)
MONOCYTES NFR BLD AUTO: 2.6 % — SIGNIFICANT CHANGE UP (ref 2–14)
NEUTROPHILS # BLD AUTO: 11.7 K/UL — HIGH (ref 1.8–7.4)
NEUTROPHILS NFR BLD AUTO: 81.2 % — HIGH (ref 43–77)
PLATELET # BLD AUTO: 303 K/UL — SIGNIFICANT CHANGE UP (ref 150–400)
POTASSIUM SERPL-MCNC: 3.8 MMOL/L — SIGNIFICANT CHANGE UP (ref 3.5–5.3)
POTASSIUM SERPL-SCNC: 3.8 MMOL/L — SIGNIFICANT CHANGE UP (ref 3.5–5.3)
PROT SERPL-MCNC: 7.8 GM/DL — SIGNIFICANT CHANGE UP (ref 6–8.3)
RBC # BLD: 4.95 M/UL — SIGNIFICANT CHANGE UP (ref 4.2–5.8)
RBC # FLD: 12.8 % — SIGNIFICANT CHANGE UP (ref 10.3–14.5)
SODIUM SERPL-SCNC: 140 MMOL/L — SIGNIFICANT CHANGE UP (ref 135–145)
WBC # BLD: 14.4 K/UL — HIGH (ref 3.8–10.5)
WBC # FLD AUTO: 14.4 K/UL — HIGH (ref 3.8–10.5)

## 2017-11-13 PROCEDURE — 99285 EMERGENCY DEPT VISIT HI MDM: CPT

## 2017-11-13 PROCEDURE — 74176 CT ABD & PELVIS W/O CONTRAST: CPT | Mod: 26

## 2017-11-13 RX ORDER — KETOROLAC TROMETHAMINE 30 MG/ML
30 SYRINGE (ML) INJECTION ONCE
Qty: 0 | Refills: 0 | Status: DISCONTINUED | OUTPATIENT
Start: 2017-11-13 | End: 2017-11-13

## 2017-11-13 RX ORDER — TAMSULOSIN HYDROCHLORIDE 0.4 MG/1
1 CAPSULE ORAL
Qty: 7 | Refills: 0 | OUTPATIENT
Start: 2017-11-13 | End: 2017-11-20

## 2017-11-13 RX ORDER — ONDANSETRON 8 MG/1
4 TABLET, FILM COATED ORAL ONCE
Qty: 0 | Refills: 0 | Status: COMPLETED | OUTPATIENT
Start: 2017-11-13 | End: 2017-11-13

## 2017-11-13 RX ORDER — MORPHINE SULFATE 50 MG/1
8 CAPSULE, EXTENDED RELEASE ORAL ONCE
Qty: 0 | Refills: 0 | Status: DISCONTINUED | OUTPATIENT
Start: 2017-11-13 | End: 2017-11-13

## 2017-11-13 RX ORDER — SODIUM CHLORIDE 9 MG/ML
1000 INJECTION INTRAMUSCULAR; INTRAVENOUS; SUBCUTANEOUS ONCE
Qty: 0 | Refills: 0 | Status: COMPLETED | OUTPATIENT
Start: 2017-11-13 | End: 2017-11-13

## 2017-11-13 RX ADMIN — MORPHINE SULFATE 8 MILLIGRAM(S): 50 CAPSULE, EXTENDED RELEASE ORAL at 18:59

## 2017-11-13 RX ADMIN — SODIUM CHLORIDE 1000 MILLILITER(S): 9 INJECTION INTRAMUSCULAR; INTRAVENOUS; SUBCUTANEOUS at 18:59

## 2017-11-13 RX ADMIN — ONDANSETRON 4 MILLIGRAM(S): 8 TABLET, FILM COATED ORAL at 19:01

## 2017-11-13 RX ADMIN — Medication 30 MILLIGRAM(S): at 19:56

## 2017-11-13 NOTE — ED PROVIDER NOTE - OBJECTIVE STATEMENT
66 y/o male with PMHx of HTN, HLD, inguinal hernia s/p hernia repair, renal calculi presents to the ED c/o severe abdominal pain beginning 3.5 hours ago.   Pt states pain radiates across abdomen and groin on the right side.  Has hx of hernias, most recently repaired in 1994.  Had 3 BM this morning, pain after the third BM but he does not feel like he injured the site during the BM.  He notes he has a hx of renal calculi but this does not feel like that pain since pain was localized to back and not to abdomen.

## 2017-11-13 NOTE — ED ADULT NURSE NOTE - OBJECTIVE STATEMENT
Pt is a 67y male, A & O x 3, VSS, presents to ED w/ lower right abdominal pain, sx's began at 3:00p, denies chest pain, SOB, nausea, vomiting or diarrhea, denies fever, chills. pt states hx of right lower hernia "many years ago", bed rails up, will continue to monitor.

## 2017-11-13 NOTE — ED PROVIDER NOTE - NS_ ATTENDINGSCRIBEDETAILS _ED_A_ED_FT
I, Nathaniel Galloway MD,  performed the initial face to face bedside interview with this patient regarding history of present illness, review of symptoms and relevant past medical, social and family history.  I completed an independent physical examination.    The history, relevant review of systems, past medical and surgical history, medical decision making, and physical examination was documented by the scribe in my presence and I attest to the accuracy of the documentation.

## 2017-11-13 NOTE — ED ADULT NURSE NOTE - CHPI ED SYMPTOMS NEG
no hematuria/no nausea/no fever/no abdominal distension/no vomiting/no dysuria/no burning urination/no diarrhea/no blood in stool/no chills

## 2017-11-14 DIAGNOSIS — R10.31 RIGHT LOWER QUADRANT PAIN: ICD-10-CM

## 2017-11-14 DIAGNOSIS — Z79.82 LONG TERM (CURRENT) USE OF ASPIRIN: ICD-10-CM

## 2017-11-14 DIAGNOSIS — N13.2 HYDRONEPHROSIS WITH RENAL AND URETERAL CALCULOUS OBSTRUCTION: ICD-10-CM

## 2017-11-14 DIAGNOSIS — Z79.899 OTHER LONG TERM (CURRENT) DRUG THERAPY: ICD-10-CM

## 2017-11-14 DIAGNOSIS — I10 ESSENTIAL (PRIMARY) HYPERTENSION: ICD-10-CM

## 2017-11-14 DIAGNOSIS — E78.00 PURE HYPERCHOLESTEROLEMIA, UNSPECIFIED: ICD-10-CM

## 2019-12-11 NOTE — DISCHARGE NOTE ADULT - CARE PLAN
Your mammogram was normal Principal Discharge DX:	SVT (supraventricular tachycardia)  Goal:	minimize arrhythmia burden; optimize cardiac health  Instructions for follow-up, activity and diet:	- Bruising at the groin, sometimes extending down the leg, and/or a small lump under the skin at the groin access site is normal and will resolve within 2 – 3 weeks.   - Occasional skipped beats or palpitations that last for a few beats are common and generally resolve within 1-2 months.   - You make walk and take stairs at a regular pace.   - Do not perform any exercise more strenuous than walking for 1 week.   - Do not strain or lift heavy objects for 1 week.  - You may shower the day after the procedure.  - Do not soak in water (such as tub baths, hot tubs, swimming, etc.) for 1 week.   - You may resume all other activities the day after the procedure.  Call your doctor if:   - you notice bleeding, redness, drainage, swelling, increased tenderness or a hot sensation around the catheter insertion site.   - your temperature is greater than 100 degrees F for more than 24 hours.  - your rapid heart rhythm returns.  - you have any questions or concerns regarding the procedure.  If significant bleeding and/or a large lump (the size of a golf ball or bigger) occurs:  - Lie flat and apply continuous direct pressure just above the puncture site for at least 10 minutes  - If the issue resolves, notify your physician immediately.    - If the bleeding cannot be controlled, please seek immediate medical attention.  If you experience increased difficulty breathing or chest pain, or if you faint or have dizzy spells, please seek immediate medical attention.

## 2020-06-22 DIAGNOSIS — Z01.818 ENCOUNTER FOR OTHER PREPROCEDURAL EXAMINATION: ICD-10-CM

## 2020-06-22 PROBLEM — N20.0 CALCULUS OF KIDNEY: Chronic | Status: ACTIVE | Noted: 2017-11-13

## 2020-06-22 PROBLEM — K40.90 UNILATERAL INGUINAL HERNIA, WITHOUT OBSTRUCTION OR GANGRENE, NOT SPECIFIED AS RECURRENT: Chronic | Status: ACTIVE | Noted: 2017-11-13

## 2020-06-23 ENCOUNTER — APPOINTMENT (OUTPATIENT)
Dept: DISASTER EMERGENCY | Facility: CLINIC | Age: 70
End: 2020-06-23

## 2020-06-23 ENCOUNTER — TRANSCRIPTION ENCOUNTER (OUTPATIENT)
Age: 70
End: 2020-06-23

## 2020-06-23 LAB — SARS-COV-2 N GENE NPH QL NAA+PROBE: NOT DETECTED

## 2021-01-11 NOTE — H&P ADULT. - GASTROINTESTINAL DETAILS
Comprehensive Nutrition Assessment    Type and Reason for Visit: Reassess  Diet education (new DM and HD)    Nutrition Recommendations/Plan:   Meals and Snacks:  Continue current diet. Nutrition Education:  Verbally reviewed prior consistent cho and renal information with Patient     Malnutrition Assessment:  Malnutrition Status: No malnutrition    Nutrition Assessment:   Nutrition History: Per pt's mother at bedside, pt has had poor PO intake for ~2 weeks PTA. Nutrition Background: Pt admitted with decreased UOP, abdominal pain, n/v/d. Newly diagnosed DM and new to HD. H/O HTN. Daily Update:  Prednisone trial started 1/10, RN reported pt refused this am.  Awaiting outpt HD slot. Up to chair at RD visit, denies any difficulties with po intake. Reviewed prior education with pt, denies any acute needs.       Nutrition Related Findings:   No physical findings indicative of malnutrition      Current Nutrition Therapies:  DIET RENAL Regular; Consistent Carb 1500-1600kcal    Current Intake:   Average Meal Intake: % Average Supplement Intake: None ordered      Anthropometric Measures:  Height: 5' 5\" (165.1 cm)  Current Body Wt: 123.4 kg (272 lb 0.8 oz)(1/8), Weight source: Bed scale  BMI: 45.3, Obese class 3 (BMI 40.0 or greater)  Admission Body Weight: 274 lb 14.6 oz(12/23, source not specified)  Ideal Body Wt: 125 lbs (57 kg), 197.5 %   Last 3 Recorded Weights in this Encounter    01/05/21 0609 01/06/21 0517 01/08/21 0407   Weight: 124.7 kg (275 lb) 124 kg (273 lb 4.8 oz) 123.4 kg (272 lb)             Estimated Daily Nutrient Needs:  Energy (kcal/day): 8949-2189 (Kcal/kg(25-30), Weight Used: Ideal(56.8 kg))  Protein (g/day): 68-74(1.2-1.3) Weight Used: (Ideal(56.8 kg))    Nutrition Diagnosis:   · Food & nutrition-related knowledge deficit related to (new medical diagnosis) as evidenced by (newly diagnosed with DM and new to HD, no prior education.)    Nutrition Interventions:   Food and/or Nutrient Delivery: Continue current diet  Nutrition Education/Counseling: (Education reinforced)  Coordination of Nutrition Care: Continue to monitor while inpatient  Plan of Care discussed with Vikas Barboza RN.      Goals: Previous Goal Met: Goal(s) achieved  Active Goal: Name 3 foods containing cho and 3 foods restricted on renal diet    Nutrition Monitoring and Evaluation:   Behavioral-Environmental Outcomes: Knowledge or skill  Food/Nutrient Intake Outcomes: Food and nutrient intake  Physical Signs/Symptoms Outcomes: Biochemical data    Discharge Planning:    Recommend pursue outpatient nutrition counseling(Available at HD facilities outpatient)    Jazmin Hayden RD, LDN on 1/11/2021 at 11:02 AM  Contact: [phone]     Disaster Mode active nontender/bowel sounds normal/no distention/soft

## 2024-10-23 NOTE — ED ADULT NURSE NOTE - TETANUS
unknown Additional Notes: 3 identified SCCs within cutaneous horns at prior visit with significant concerns regarding pursuing surgery, radiation, and intralesional chemotherapy. Outlined R/B/A to all including option for miller-operative topical chemotherapy with 5FU in attempts to shrink. Elected 4 weeks of this with re-evaluation at that time. Likely to discuss surgical intervention at that time. Render Risk Assessment In Note?: no Detail Level: Simple